# Patient Record
Sex: FEMALE | Race: BLACK OR AFRICAN AMERICAN | NOT HISPANIC OR LATINO | URBAN - METROPOLITAN AREA
[De-identification: names, ages, dates, MRNs, and addresses within clinical notes are randomized per-mention and may not be internally consistent; named-entity substitution may affect disease eponyms.]

---

## 2019-08-15 ENCOUNTER — OFFICE (OUTPATIENT)
Dept: URBAN - METROPOLITAN AREA CLINIC 11 | Facility: CLINIC | Age: 79
End: 2019-08-15
Payer: MEDICARE

## 2019-08-15 VITALS
DIASTOLIC BLOOD PRESSURE: 74 MMHG | HEART RATE: 81 BPM | HEIGHT: 67 IN | WEIGHT: 252 LBS | SYSTOLIC BLOOD PRESSURE: 165 MMHG

## 2019-08-15 DIAGNOSIS — R10.30 LOWER ABDOMINAL PAIN, UNSPECIFIED: ICD-10-CM

## 2019-08-15 DIAGNOSIS — K21.9 GASTRO-ESOPHAGEAL REFLUX DISEASE WITHOUT ESOPHAGITIS: ICD-10-CM

## 2019-08-15 PROCEDURE — 99203 OFFICE O/P NEW LOW 30 MIN: CPT | Performed by: INTERNAL MEDICINE

## 2019-08-15 NOTE — SERVICEHPINOTES
She presetns for [facility] of a lower abdominal pain that she had had earlier in the summer.  She had been having lower abdomiinal pain with some n/v.  She was seen at [facility] and had a CT which revealed diverticulosis.  She was subsequently found to have renal stones.  She stated that she was was given a medication to "make them pass".  Her symptoms have resolved.  She has not had further issues.She has a history of reflux and is taking Prilosec and Zantac for "gas".  She has not had particular issues while on them.  She has not had dysphagia. She has not had issues with heartburn on her meds. Her CT from [facility] is noted with diverticulosis.  She was not been anemic. . She stated that she had a colonoscopy about 2 1/2 years ago.  She did not recall the doctor but it might have been Dr. Jimenez.

## 2019-08-15 NOTE — SERVICENOTES
We dsicussed her lower abdominal pain and resolution with passage of renal stones.  At this point, she would not need a further GI evaluation.  We discussed the need to call if there are difficulties.  We reviewed her Ct and discussed her diverticulosis with risks of diverticulitis over time.  We discussed her diet plan with her diabetes and GERD.  Additionally we discussed her use of the PPIs long term. She reports having at least 2 prior colonoscopies with the last one about 2 years ago.  She has not had polyps or a family hx of colon cancer/colon polyps.

## 2021-07-02 ENCOUNTER — OFFICE (OUTPATIENT)
Dept: URBAN - METROPOLITAN AREA CLINIC 11 | Facility: CLINIC | Age: 81
End: 2021-07-02
Payer: MEDICARE

## 2021-07-02 VITALS
WEIGHT: 246 LBS | OXYGEN SATURATION: 95 % | DIASTOLIC BLOOD PRESSURE: 67 MMHG | HEIGHT: 67 IN | SYSTOLIC BLOOD PRESSURE: 171 MMHG | HEART RATE: 69 BPM

## 2021-07-02 DIAGNOSIS — K59.00 CONSTIPATION, UNSPECIFIED: ICD-10-CM

## 2021-07-02 DIAGNOSIS — R10.31 RIGHT LOWER QUADRANT PAIN: ICD-10-CM

## 2021-07-02 DIAGNOSIS — K21.9 GASTRO-ESOPHAGEAL REFLUX DISEASE WITHOUT ESOPHAGITIS: ICD-10-CM

## 2021-07-02 PROCEDURE — 99214 OFFICE O/P EST MOD 30 MIN: CPT | Performed by: NURSE PRACTITIONER

## 2021-07-02 RX ORDER — CELECOXIB 100 MG/1
CAPSULE ORAL
Qty: 30 | Refills: 0 | Status: ACTIVE
Start: 2021-07-02

## 2021-08-06 ENCOUNTER — OFFICE (OUTPATIENT)
Dept: URBAN - METROPOLITAN AREA CLINIC 11 | Facility: CLINIC | Age: 81
End: 2021-08-06
Payer: MEDICARE

## 2021-08-06 VITALS
WEIGHT: 242 LBS | HEIGHT: 67 IN | HEART RATE: 69 BPM | DIASTOLIC BLOOD PRESSURE: 79 MMHG | SYSTOLIC BLOOD PRESSURE: 191 MMHG | OXYGEN SATURATION: 96 %

## 2021-08-06 DIAGNOSIS — R10.31 RIGHT LOWER QUADRANT PAIN: ICD-10-CM

## 2021-08-06 DIAGNOSIS — K21.9 GASTRO-ESOPHAGEAL REFLUX DISEASE WITHOUT ESOPHAGITIS: ICD-10-CM

## 2021-08-06 DIAGNOSIS — K59.00 CONSTIPATION, UNSPECIFIED: ICD-10-CM

## 2021-08-06 DIAGNOSIS — M54.5 LOW BACK PAIN: ICD-10-CM

## 2021-08-06 PROCEDURE — 99214 OFFICE O/P EST MOD 30 MIN: CPT | Performed by: NURSE PRACTITIONER

## 2021-08-06 RX ORDER — GABAPENTIN 100 MG/1
100 CAPSULE ORAL
Qty: 30 | Refills: 2 | Status: ACTIVE
Start: 2021-08-06

## 2021-08-06 NOTE — SERVICEHPINOTES
Ms. Nichols is an 81 year old female that presents today for follow up of RLQ pain. Pain will hurt from her side to the lower part of her stomach and to the lower part of her back. Sometimes pain will get a little better after she has a bowel movement. Pain will sometimes improve when she lays down to rest. Pain seems to hurt more when she is in the process of getting up out of the chair or bed. She thinks that she doesn't get proper rest due to taking care of her daughters. She notes that her constipation is well controlled. She is having formed, brown, bowel movements daily. BR

## 2021-08-06 NOTE — SERVICENOTES
With the history of her symptoms and negative findings in GI aspect, will send in Neurontin for possible radiculopathy and get her set up with Dr. Llanes at [facility].

## 2021-11-16 ENCOUNTER — OFFICE (OUTPATIENT)
Dept: URBAN - METROPOLITAN AREA CLINIC 11 | Facility: CLINIC | Age: 81
End: 2021-11-16
Payer: MEDICARE

## 2021-11-16 VITALS
WEIGHT: 243 LBS | HEART RATE: 72 BPM | HEIGHT: 67 IN | OXYGEN SATURATION: 97 % | SYSTOLIC BLOOD PRESSURE: 145 MMHG | DIASTOLIC BLOOD PRESSURE: 69 MMHG

## 2021-11-16 DIAGNOSIS — K59.00 CONSTIPATION, UNSPECIFIED: ICD-10-CM

## 2021-11-16 DIAGNOSIS — K21.9 GASTRO-ESOPHAGEAL REFLUX DISEASE WITHOUT ESOPHAGITIS: ICD-10-CM

## 2021-11-16 PROCEDURE — 99214 OFFICE O/P EST MOD 30 MIN: CPT | Performed by: NURSE PRACTITIONER

## 2021-11-16 NOTE — SERVICEHPINOTES
Ms. Nichols is an 81 year old female that presents today for follow up of RLQ pain. She notes that she is not having abdominal pain, but she continues to have low back pain. She notes that it occurs with physical movement. It does get better with some rest. She uses a cane for ambulation. tahmina martel 8/17/21 MRI of lumbar spine: acute/subacute compression fractures of L1 and L2.  
tahmina martel She notes that her constipation is well controlled on Linzess. She takes it once every day. She has a bowel movement once daily. Stools are soft and formed. She denies any presence of blood. 
tahmina martel She notes that she takes Omeprazole every morning before breakfast. She denies retrosternal burning and acid reflux. She denies dysphagia and epigastric pain. She notes that she does have some belching after eating. tahmina martel

## 2021-11-16 NOTE — SERVICENOTES
We discussed her back pain and evidence of acute/subacute compression fractures L1-L2 and need for evaluation with neurosurgery. We will get her set up today to see that surgeon. We discussed need for screening colonoscopy after evaluation of her back. Will continue linzess daily for chronic constipation.